# Patient Record
Sex: MALE | Race: BLACK OR AFRICAN AMERICAN
[De-identification: names, ages, dates, MRNs, and addresses within clinical notes are randomized per-mention and may not be internally consistent; named-entity substitution may affect disease eponyms.]

---

## 2017-05-20 ENCOUNTER — HOSPITAL ENCOUNTER (EMERGENCY)
Dept: HOSPITAL 62 - ER | Age: 37
Discharge: HOME | End: 2017-05-20
Payer: SELF-PAY

## 2017-05-20 VITALS — SYSTOLIC BLOOD PRESSURE: 148 MMHG | DIASTOLIC BLOOD PRESSURE: 87 MMHG

## 2017-05-20 DIAGNOSIS — F17.200: ICD-10-CM

## 2017-05-20 DIAGNOSIS — Z86.19: ICD-10-CM

## 2017-05-20 DIAGNOSIS — R10.13: Primary | ICD-10-CM

## 2017-05-20 DIAGNOSIS — Z71.6: ICD-10-CM

## 2017-05-20 LAB
ALBUMIN SERPL-MCNC: 4.2 G/DL (ref 3.5–5)
ALP SERPL-CCNC: 58 U/L (ref 38–126)
ALT SERPL-CCNC: 37 U/L (ref 21–72)
ANION GAP SERPL CALC-SCNC: 10 MMOL/L (ref 5–19)
APPEARANCE UR: (no result)
AST SERPL-CCNC: 25 U/L (ref 17–59)
BASOPHILS # BLD AUTO: 0.1 10^3/UL (ref 0–0.2)
BASOPHILS NFR BLD AUTO: 1 % (ref 0–2)
BILIRUB DIRECT SERPL-MCNC: 0.4 MG/DL (ref 0–0.4)
BILIRUB SERPL-MCNC: 0.5 MG/DL (ref 0.2–1.3)
BILIRUB UR QL STRIP: NEGATIVE
BUN SERPL-MCNC: 12 MG/DL (ref 7–20)
CALCIUM: 9.7 MG/DL (ref 8.4–10.2)
CHLORIDE SERPL-SCNC: 105 MMOL/L (ref 98–107)
CO2 SERPL-SCNC: 27 MMOL/L (ref 22–30)
CREAT SERPL-MCNC: 1.06 MG/DL (ref 0.52–1.25)
EOSINOPHIL # BLD AUTO: 0.4 10^3/UL (ref 0–0.6)
EOSINOPHIL NFR BLD AUTO: 4.3 % (ref 0–6)
ERYTHROCYTE [DISTWIDTH] IN BLOOD BY AUTOMATED COUNT: 13.6 % (ref 11.5–14)
GLUCOSE SERPL-MCNC: 116 MG/DL (ref 75–110)
GLUCOSE UR STRIP-MCNC: NEGATIVE MG/DL
HCT VFR BLD CALC: 42.8 % (ref 37.9–51)
HGB BLD-MCNC: 14.6 G/DL (ref 13.5–17)
HGB HCT DIFFERENCE: 1
KETONES UR STRIP-MCNC: NEGATIVE MG/DL
LIPASE SERPL-CCNC: 141.1 U/L (ref 23–300)
LYMPHOCYTES # BLD AUTO: 2.5 10^3/UL (ref 0.5–4.7)
LYMPHOCYTES NFR BLD AUTO: 30.4 % (ref 13–45)
MCH RBC QN AUTO: 30.3 PG (ref 27–33.4)
MCHC RBC AUTO-ENTMCNC: 34.1 G/DL (ref 32–36)
MCV RBC AUTO: 89 FL (ref 80–97)
MONOCYTES # BLD AUTO: 0.7 10^3/UL (ref 0.1–1.4)
MONOCYTES NFR BLD AUTO: 8.2 % (ref 3–13)
NEUTROPHILS # BLD AUTO: 4.6 10^3/UL (ref 1.7–8.2)
NEUTS SEG NFR BLD AUTO: 56.1 % (ref 42–78)
NITRITE UR QL STRIP: NEGATIVE
PH UR STRIP: 7 [PH] (ref 5–9)
POTASSIUM SERPL-SCNC: 3.8 MMOL/L (ref 3.6–5)
PROT SERPL-MCNC: 7.2 G/DL (ref 6.3–8.2)
PROT UR STRIP-MCNC: NEGATIVE MG/DL
RBC # BLD AUTO: 4.81 10^6/UL (ref 4.35–5.55)
SODIUM SERPL-SCNC: 142.3 MMOL/L (ref 137–145)
SP GR UR STRIP: 1.01
UROBILINOGEN UR-MCNC: NEGATIVE MG/DL (ref ?–2)
WBC # BLD AUTO: 8.3 10^3/UL (ref 4–10.5)

## 2017-05-20 PROCEDURE — 99284 EMERGENCY DEPT VISIT MOD MDM: CPT

## 2017-05-20 PROCEDURE — 81001 URINALYSIS AUTO W/SCOPE: CPT

## 2017-05-20 PROCEDURE — 36415 COLL VENOUS BLD VENIPUNCTURE: CPT

## 2017-05-20 PROCEDURE — 85025 COMPLETE CBC W/AUTO DIFF WBC: CPT

## 2017-05-20 PROCEDURE — 83690 ASSAY OF LIPASE: CPT

## 2017-05-20 PROCEDURE — 80053 COMPREHEN METABOLIC PANEL: CPT

## 2017-05-20 PROCEDURE — 74022 RADEX COMPL AQT ABD SERIES: CPT

## 2017-05-20 NOTE — ER DOCUMENT REPORT
ED GI/





- General


Chief Complaint: Epigastric Pain


Stated Complaint: ABDOMINAL PAIN


Time Seen by Provider: 05/20/17 17:41


Mode of Arrival: Ambulatory


Notes: 


Patient is a 36-year-old male that comes emergency department for chief 

complaint of pain in his upper abdomen that has been present for months, he 

states this is gradually worsening, he has been on Prevacid, omeprazole, he has 

been treated for H. pylori already, he had a recent ultrasound which showed a 

polyp but no cholelithiasis, he states that he is being referred for an 

endoscopy but he has become too uncomfortable over the past several days to 

wait.  He reports nausea, denies vomiting.  He denies fever or chills, he 

denies black or bloody stools.  He states pain is worse when he gets up in the 

morning and also after he eats.  He denies any surgeries, he denies any medical 

history otherwise, he denies any daily medications.





TRAVEL OUTSIDE OF THE [place] IN LAST 30 DAYS: No





- Related Data


Allergies/Adverse Reactions: 


 





No Known Allergies Allergy (Unverified 05/20/17 17:36)


 











Past Medical History





- General


Information source: Patient - pt. states he has been having epigastric pain 

after eating for the past few months -- has seen his PCP and was diagnosed with 

H. Pylori and started on abx but symptoms have persisted





- Social History


Smoking Status: Current Every Day Smoker


Smoking Education Provided: Yes - <3 min


Lives with: Family


Family History: Reviewed & Not Pertinent


Patient has suicidal ideation: No


Patient has homicidal ideation: No





- Medical History


Medical History: Negative


Renal/ Medical History: Denies: Hx Peritoneal Dialysis


Surgical Hx: Negative





- Immunizations


Immunizations up to date: Yes


Hx Diphtheria, Pertussis, Tetanus Vaccination: Yes





Review of Systems





- Review of Systems


Constitutional: No symptoms reported


EENT: No symptoms reported


Cardiovascular: No symptoms reported


Respiratory: No symptoms reported


Gastrointestinal: See HPI


Genitourinary: No symptoms reported


Male Genitourinary: No symptoms reported


Musculoskeletal: No symptoms reported


Skin: No symptoms reported


Hematologic/Lymphatic: No symptoms reported


Neurological/Psychological: No symptoms reported





Physical Exam





- Vital signs


Vitals: 


 











Temp Pulse Resp BP Pulse Ox


 


 98.1 F   91   18   147/88 H  98 


 


 05/20/17 17:37  05/20/17 17:37  05/20/17 17:37  05/20/17 17:37  05/20/17 17:37











Interpretation: Normal





- General


General appearance: Appears well, Alert


In distress: None





- HEENT


Head: Normocephalic, Atraumatic


Eyes: Normal


Conjunctiva: Normal


Extraocular movements intact: Yes


Eyelashes: Normal


Pupils: PERRL


Mouth/Lips: Normal


Mucous membranes: Normal


Pharynx: Normal


Neck: Normal





- Respiratory


Respiratory status: No respiratory distress


Chest status: Nontender


Breath sounds: Normal


Chest palpation: Normal





- Cardiovascular


Rhythm: Regular


Heart sounds: Normal auscultation


Murmur: No





- Abdominal


Inspection: Normal


Distension: No distension


Bowel sounds: Normal


Tenderness: Nontender - Unremarkable abdominal exam, no tenderness or guarding 

noted.  No: Tender


Organomegaly: No organomegaly





- Back


Back: Normal, Nontender.  No: Tender





- Extremities


General upper extremity: Normal inspection, Nontender, Normal color, Normal ROM

, Normal temperature


General lower extremity: Normal inspection, Nontender, Normal color, Normal ROM

, Normal temperature, Normal weight bearing.  No: Ruby's sign





- Neurological


Neuro grossly intact: Yes


Cognition: Normal


Orientation: AAOx4


Stanley Coma Scale Eye Opening: Spontaneous


Roldan Coma Scale Verbal: Oriented


Roldan Coma Scale Motor: Obeys Commands


Roldan Coma Scale Total: 15


Speech: Normal


Cranial nerves: Normal


Cerebellar coordination: Normal


Motor strength normal: LUE, RUE, LLE, RLE


Additional motor exam normals: Equal 


Sensory: Normal





- Psychological


Associated symptoms: Normal affect, Normal mood





- Skin


Skin Temperature: Warm


Skin Moisture: Dry


Skin Color: Normal





Course





- Re-evaluation


Re-evalutation: 


Reviewed acute abdominal series performed in triage, no acute abnormality 

noted.  CBC, chemistry, lipase, urinalysis unremarkable.  Patient already has 

had an ultrasound, has an unremarkable abdominal exam, reports pain 

specifically in the morning and after eating, already has an endoscopy in a few 

days scheduled to be performed.  Patient will be given Carafate, omeprazole, 

some pain medicine to take as needed, discussed smoking cessation, discussed 

follow-up and return precautions.  Patient states understanding and agreement, 

states he will quit smoking, states he will take the medications and return for 

any concerning symptoms as discussed.





- Vital Signs


Vital signs: 


 











Temp Pulse Resp BP Pulse Ox


 


 97.7 F   66   17   148/87 H  97 


 


 05/20/17 20:18  05/20/17 20:18  05/20/17 20:18  05/20/17 20:18  05/20/17 20:18














- Laboratory


Result Diagrams: 


 05/20/17 18:00





 05/20/17 18:00


Laboratory results interpreted by me: 


 











  05/20/17





  18:00


 


Glucose  116 H














Discharge





- Discharge


Clinical Impression: 


 Epigastric pain





Condition: Stable


Disposition: HOME, SELF-CARE


Additional Instructions: 


Your laboratory workup and examination today does not show any acute findings.


Please follow-up with your endoscopy as planned, take the Carafate and 

omeprazole prescriptions as prescribed, take the pain medicine only if needed 

additionally.  Avoid spicy food, smoking, alcohol, over-the-counter anti-

inflammatories.


Return for any concerning or worsening symptoms including vomiting, vomiting 

blood, black stools, or severe continuous abdominal pain.


Prescriptions: 


Hydrocodone/Acetaminophen [Norco 5-325 mg Tablet] 1 - 2 tab PO ASDIR #10 tablet


Omeprazole 40 mg PO DAILY #30 capsule.


Sucralfate [Carafate 1 gm Tablet] 1 gm PO QID #40 tablet


Forms:  Elevated Blood Pressure


Referrals: 


BEBETO VALENZUELA MD [Primary Care Provider] - Follow up as needed

## 2017-05-20 NOTE — ER DOCUMENT REPORT
ED Medical Screen (RME)





- General


Chief Complaint: Epigastric Pain


Stated Complaint: ABDOMINAL PAIN


Time Seen by Provider: 05/20/17 17:41


Mode of Arrival: Ambulatory


Information source: Patient - pt. states he has been having epigastric pain 

after eating for the past few months -- has seen his PCP and was diagnosed with 

H. Pylori and started on abx but symptoms have persisted


TRAVEL OUTSIDE OF THE [place] IN LAST 30 DAYS: No





- Related Data


Allergies/Adverse Reactions: 


 





No Known Allergies Allergy (Unverified 05/20/17 17:36)


 











Past Medical History


Renal/ Medical History: Denies: Hx Peritoneal Dialysis





Physical Exam





- Vital signs


Vitals: 





 











Temp Pulse Resp BP Pulse Ox


 


 98.1 F   91   18   147/88 H  98 


 


 05/20/17 17:37  05/20/17 17:37  05/20/17 17:37  05/20/17 17:37  05/20/17 17:37














Course





- Vital Signs


Vital signs: 





 











Temp Pulse Resp BP Pulse Ox


 


 98.1 F   91   18   147/88 H  98 


 


 05/20/17 17:37  05/20/17 17:37  05/20/17 17:37  05/20/17 17:37  05/20/17 17:37

## 2017-05-25 ENCOUNTER — HOSPITAL ENCOUNTER (EMERGENCY)
Dept: HOSPITAL 62 - ER | Age: 37
Discharge: ANOTHER HOSPITAL | End: 2017-05-25
Payer: SELF-PAY

## 2017-05-25 VITALS — DIASTOLIC BLOOD PRESSURE: 83 MMHG | SYSTOLIC BLOOD PRESSURE: 147 MMHG

## 2017-05-25 DIAGNOSIS — R10.13: ICD-10-CM

## 2017-05-25 DIAGNOSIS — R11.2: ICD-10-CM

## 2017-05-25 DIAGNOSIS — Z86.19: ICD-10-CM

## 2017-05-25 DIAGNOSIS — R10.12: ICD-10-CM

## 2017-05-25 DIAGNOSIS — I16.1: Primary | ICD-10-CM

## 2017-05-25 DIAGNOSIS — I10: ICD-10-CM

## 2017-05-25 LAB
ALBUMIN SERPL-MCNC: 5.1 G/DL (ref 3.5–5)
ALP SERPL-CCNC: 78 U/L (ref 38–126)
ALT SERPL-CCNC: 43 U/L (ref 21–72)
ANION GAP SERPL CALC-SCNC: 16 MMOL/L (ref 5–19)
APPEARANCE UR: CLEAR
APTT BLD: 32.9 SEC (ref 23.5–35.8)
AST SERPL-CCNC: 27 U/L (ref 17–59)
BASOPHILS # BLD AUTO: 0.1 10^3/UL (ref 0–0.2)
BASOPHILS NFR BLD AUTO: 0.8 % (ref 0–2)
BILIRUB DIRECT SERPL-MCNC: 0.3 MG/DL (ref 0–0.4)
BILIRUB SERPL-MCNC: 0.7 MG/DL (ref 0.2–1.3)
BILIRUB UR QL STRIP: NEGATIVE
BUN SERPL-MCNC: 11 MG/DL (ref 7–20)
CALCIUM: 10.5 MG/DL (ref 8.4–10.2)
CHLORIDE SERPL-SCNC: 102 MMOL/L (ref 98–107)
CK MB SERPL-MCNC: 1.85 NG/ML (ref ?–4.55)
CO2 SERPL-SCNC: 25 MMOL/L (ref 22–30)
CREAT SERPL-MCNC: 1.06 MG/DL (ref 0.52–1.25)
EOSINOPHIL # BLD AUTO: 0.1 10^3/UL (ref 0–0.6)
EOSINOPHIL NFR BLD AUTO: 0.4 % (ref 0–6)
ERYTHROCYTE [DISTWIDTH] IN BLOOD BY AUTOMATED COUNT: 13.7 % (ref 11.5–14)
GLUCOSE SERPL-MCNC: 126 MG/DL (ref 75–110)
GLUCOSE UR STRIP-MCNC: NEGATIVE MG/DL
HCT VFR BLD CALC: 51 % (ref 37.9–51)
HGB BLD-MCNC: 17.2 G/DL (ref 13.5–17)
HGB HCT DIFFERENCE: 0.6
KETONES UR STRIP-MCNC: 20 MG/DL
LYMPHOCYTES # BLD AUTO: 1.3 10^3/UL (ref 0.5–4.7)
LYMPHOCYTES NFR BLD AUTO: 10 % (ref 13–45)
MCH RBC QN AUTO: 29.9 PG (ref 27–33.4)
MCHC RBC AUTO-ENTMCNC: 33.8 G/DL (ref 32–36)
MCV RBC AUTO: 88 FL (ref 80–97)
MONOCYTES # BLD AUTO: 0.6 10^3/UL (ref 0.1–1.4)
MONOCYTES NFR BLD AUTO: 4.4 % (ref 3–13)
NEUTROPHILS # BLD AUTO: 11.3 10^3/UL (ref 1.7–8.2)
NEUTS SEG NFR BLD AUTO: 84.4 % (ref 42–78)
NITRITE UR QL STRIP: NEGATIVE
PH UR STRIP: 9 [PH] (ref 5–9)
POTASSIUM SERPL-SCNC: 4.1 MMOL/L (ref 3.6–5)
PROT SERPL-MCNC: 8.3 G/DL (ref 6.3–8.2)
PROT UR STRIP-MCNC: NEGATIVE MG/DL
PROTHROMBIN TIME: 12.9 SEC (ref 11.4–15.4)
RBC # BLD AUTO: 5.77 10^6/UL (ref 4.35–5.55)
SODIUM SERPL-SCNC: 142.5 MMOL/L (ref 137–145)
SP GR UR STRIP: 1.01
TROPONIN I SERPL-MCNC: 0.12 NG/ML
UROBILINOGEN UR-MCNC: NEGATIVE MG/DL (ref ?–2)
WBC # BLD AUTO: 13.3 10^3/UL (ref 4–10.5)

## 2017-05-25 PROCEDURE — 96366 THER/PROPH/DIAG IV INF ADDON: CPT

## 2017-05-25 PROCEDURE — 84484 ASSAY OF TROPONIN QUANT: CPT

## 2017-05-25 PROCEDURE — 74177 CT ABD & PELVIS W/CONTRAST: CPT

## 2017-05-25 PROCEDURE — 85730 THROMBOPLASTIN TIME PARTIAL: CPT

## 2017-05-25 PROCEDURE — 96365 THER/PROPH/DIAG IV INF INIT: CPT

## 2017-05-25 PROCEDURE — 82550 ASSAY OF CK (CPK): CPT

## 2017-05-25 PROCEDURE — 85025 COMPLETE CBC W/AUTO DIFF WBC: CPT

## 2017-05-25 PROCEDURE — 93010 ELECTROCARDIOGRAM REPORT: CPT

## 2017-05-25 PROCEDURE — 96376 TX/PRO/DX INJ SAME DRUG ADON: CPT

## 2017-05-25 PROCEDURE — 99291 CRITICAL CARE FIRST HOUR: CPT

## 2017-05-25 PROCEDURE — 93005 ELECTROCARDIOGRAM TRACING: CPT

## 2017-05-25 PROCEDURE — 96375 TX/PRO/DX INJ NEW DRUG ADDON: CPT

## 2017-05-25 PROCEDURE — 80053 COMPREHEN METABOLIC PANEL: CPT

## 2017-05-25 PROCEDURE — 36415 COLL VENOUS BLD VENIPUNCTURE: CPT

## 2017-05-25 PROCEDURE — 85610 PROTHROMBIN TIME: CPT

## 2017-05-25 PROCEDURE — 81001 URINALYSIS AUTO W/SCOPE: CPT

## 2017-05-25 PROCEDURE — 82553 CREATINE MB FRACTION: CPT

## 2017-05-25 RX ADMIN — NITROGLYCERIN PRN TAB: 0.4 TABLET SUBLINGUAL at 09:28

## 2017-05-25 RX ADMIN — NITROGLYCERIN PRN TAB: 0.4 TABLET SUBLINGUAL at 09:35

## 2017-05-25 NOTE — RADIOLOGY REPORT (SQ)
EXAM DESCRIPTION:  CT ABD/PELVIS WITH IV ONLY



COMPLETED DATE/TIME:  5/25/2017 9:15 am



REASON FOR STUDY:  LUQ pain



COMPARISON:  None.



TECHNIQUE:  CT scan of the abdomen and pelvis performed using helical scanning technique with dynamic
 intravenous contrast injection.  No oral contrast. Images reviewed with lung, soft tissue, and bone 
windows. Reconstructed coronal and sagittal MPR images reviewed. Delayed images for evaluation of the
 urinary system also acquired. All images stored on PACS.

All CT scanners at this facility use dose modulation, iterative reconstruction, and/or weight based d
osing when appropriate to reduce radiation dose to as low as reasonably achievable (ALARA).

[facility]: Dose Right  [facility]: CareDose    [facility]: Dose Right    [facility]: Teradose 4D    [facility]: Smart Technologies



CONTRAST TYPE AND DOSE:  90mL Isovue 370- low osmolar.



RENAL FUNCTION:  1.06



RADIATION DOSE:  13.54mGy.



LIMITATIONS:  None.



FINDINGS:  LOWER CHEST: No significant findings. No nodules or infiltrates.

LIVER: Normal size. No masses or dilated ducts.

SPLEEN: Normal size. No focal lesions.

PANCREAS: No masses. No significant calcifications. No adjacent inflammation or peripancreatic fluid 
collections. Pancreatic duct not dilated.

GALLBLADDER: No identified stones by CT criteria. No inflammatory changes to suggest cholecystitis.

ADRENAL GLANDS: No significant masses or asymmetry.

RIGHT KIDNEY AND URETER: No solid masses.   No significant calcifications.   No hydronephrosis or hyd
roureter.

LEFT KIDNEY AND URETER: A couple small less than 1 cm cysts.  No solid masses.   No significant calci
fications.   No hydronephrosis or hydroureter.

AORTA AND VESSELS: No aneurysm. No dissection. Renal arteries, SMA, celiac without stenosis.

RETROPERITONEUM: No retroperitoneal adenopathy, hemorrhage or masses.

BOWEL AND PERITONEAL CAVITY: No masses or inflammatory changes. No free fluid or peritoneal masses.

APPENDIX: Normal.

PELVIS: No mass or free fluid. Normal bladder.

ABDOMINAL WALL: Small fat containing umbilical hernia not felt to be significant.

BONES: No significant or acute findings.

OTHER: No other significant finding.



IMPRESSION:  NO SIGNIFICANT OR ACUTE FINDING IN THE ABDOMEN OR PELVIS ON CT SCAN WITH IV CONTRAST.



TECHNICAL DOCUMENTATION:  JOB ID:  [id number]

Quality ID # 436: Final reports with documentation of one or more dose reduction techniques (e.g., Au
tomated exposure control, adjustment of the mA and/or kV according to patient size, use of iterative 
reconstruction technique)

 2011 [employer]- All Rights Reserved

## 2017-05-25 NOTE — EKG REPORT
SEVERITY:- ABNORMAL ECG -

SINUS RHYTHM

PROBABLE LEFT ATRIAL ABNORMALITY

ABNORMAL T, PROBABLE ISCHEMIA, ANT-LAT LEADS

:

Confirmed by: Renard Palacios MD 25-May-2017 18:44:27

## 2017-05-25 NOTE — ER DOCUMENT REPORT
ED General





- General


Chief Complaint: Epigastric Pain


Stated Complaint: CHEST PAIN,ABDOMINAL PAIN


Time Seen by Provider: 05/25/17 07:59


Mode of Arrival: Ambulatory


Information source: Patient


Notes: 


36-year-old male presents with complaints of left upper quadrant abdominal pain 

epigastric pain of 8 months duration.  Patient denies any fevers or chills, 

admits nausea vomiting.  Patient has been seen multiple times for this and has 

been treated for H. pylori and was treated for ulcers which improved his 

symptoms but did not resolve them.  Patient notes pain worsened this morning.  

Patient has also been told he has a polyp on his gallbladder from previous 

ultrasound but no gallstones


TRAVEL OUTSIDE OF THE [place] IN LAST 30 DAYS: No





- HPI


Onset: Other


Onset/Duration: Intermittent


Quality of pain: Burning


Severity: Mild


Pain Level: 1


Associated symptoms: Nausea, Vomiting


Exacerbated by: Denies


Relieved by: Denies


Similar symptoms previously: Yes


Recently seen / treated by doctor: Yes





- Related Data


Allergies/Adverse Reactions: 


 





No Known Allergies Allergy (Unverified 05/20/17 17:36)


 











Past Medical History





- Social History


Smoking Status: Never Smoker


Cigarette use (# per day): No


Chew tobacco use (# tins/day): No


Smoking Education Provided: No


Family History: Reviewed & Not Pertinent


Renal/ Medical History: Denies: Hx Peritoneal Dialysis





- Immunizations


Immunizations up to date: Yes


Hx Diphtheria, Pertussis, Tetanus Vaccination: Yes





Review of Systems





- Review of Systems


Notes: 


PHYSICAL EXAMINATION:





GENERAL: Well-appearing, well-nourished and in no acute distress.





HEAD: Atraumatic, normocephalic.





EYES: Pupils equal round and reactive to light, extraocular movements intact, 

conjunctiva are normal.





ENT: Nares patent, oropharynx clear without exudates.  Moist mucous membranes.





NECK: Normal range of motion, supple without lymphadenopathy





LUNGS: Breath sounds clear to auscultation bilaterally and equal.  No wheezes 

rales or rhonchi.





HEART: Regular rate and rhythm without murmurs





ABDOMEN: Soft, tender in epigastric left upper quadrant no rebound no guarding





Female : deferred





Musculoskeletal: Normal range of motion, no pitting or edema.  No cyanosis.





NEUROLOGICAL: Cranial nerves grossly intact.  Normal speech, normal gait.  

Normal sensory, motor exams





PSYCH: Normal mood, normal affect.





SKIN: Warm, Dry, normal turgor, no rashes or lesions noted.





Physical Exam





- Vital signs


Vitals: 


 











Temp Pulse Resp BP Pulse Ox


 


 98.2 F   77   22 H  229/117 H  100 


 


 05/25/17 07:52  05/25/17 07:52  05/25/17 07:52  05/25/17 07:52  05/25/17 07:52














Course





- Re-evaluation


Re-evalutation: 


05/25/17 08:10


Patient has probable ulceration, this has possiviltiy to be cardiac in nature, 

a CT has been ordered I will evaluate for left upper quadrant and epigastric 

pain.





05/25/17 09:36


Patient is noted to have significant EKG changes, he is quite hypertensive, his 

troponin is noted to be 0.119


[facility] has been paged





05/25/17 10:24


Spoke with Dr oseguera WHO WILL ACCEPT FOR TRANSFER





05/25/17 13:54


   Patient is stable blood pressures improved he states he feels much better, 

transferred here to take him,





- Vital Signs


Vital signs: 


 











Temp Pulse Resp BP Pulse Ox


 


 98.2 F   77   27 H  147/83 H  95 


 


 05/25/17 13:46  05/25/17 07:52  05/25/17 13:32  05/25/17 13:32  05/25/17 13:32














- Laboratory


Result Diagrams: 


 05/25/17 08:15





 05/25/17 08:15


Laboratory results interpreted by me: 


 











  05/25/17 05/25/17 05/25/17





  08:15 08:15 08:15


 


WBC  13.3 H  


 


RBC  5.77 H  


 


Hgb  17.2 H  


 


Seg Neutrophils %  84.4 H  


 


Lymphocytes %  10.0 L  


 


Absolute Neutrophils  11.3 H  


 


Glucose   126 H 


 


Calcium   10.5 H 


 


Creatine Kinase    203 H


 


Total Protein   8.3 H 


 


Albumin   5.1 H 


 


Urine Ketones   














  05/25/17





  09:39


 


WBC 


 


RBC 


 


Hgb 


 


Seg Neutrophils % 


 


Lymphocytes % 


 


Absolute Neutrophils 


 


Glucose 


 


Calcium 


 


Creatine Kinase 


 


Total Protein 


 


Albumin 


 


Urine Ketones  20 H














Critical Care Note





- Critical Care Note


Total time excluding time spent on procedures (mins): 44


Comments: 


44 minutes of critical care time spent in direct contact evaluating and 

reevaluating the patient, treating symptoms, reviewing labs and studies and 

speaking with family  and consultants excluding any procedures





Discharge





- Discharge


Clinical Impression: 


 Hypertensive emergency





Abdominal pain


Qualifiers:


 Abdominal location: epigastric Qualified Code(s): R10.13 - Epigastric pain





Condition: Serious


Disposition: [facility]

## 2017-06-09 ENCOUNTER — HOSPITAL ENCOUNTER (OUTPATIENT)
Dept: HOSPITAL 62 - END | Age: 37
Discharge: HOME | End: 2017-06-09
Attending: INTERNAL MEDICINE
Payer: SELF-PAY

## 2017-06-09 VITALS — DIASTOLIC BLOOD PRESSURE: 77 MMHG | SYSTOLIC BLOOD PRESSURE: 140 MMHG

## 2017-06-09 DIAGNOSIS — Z79.899: ICD-10-CM

## 2017-06-09 DIAGNOSIS — I10: ICD-10-CM

## 2017-06-09 DIAGNOSIS — B96.81: ICD-10-CM

## 2017-06-09 DIAGNOSIS — K29.50: Primary | ICD-10-CM

## 2017-06-09 PROCEDURE — 88305 TISSUE EXAM BY PATHOLOGIST: CPT

## 2017-06-09 PROCEDURE — 43239 EGD BIOPSY SINGLE/MULTIPLE: CPT

## 2017-06-09 PROCEDURE — 88342 IMHCHEM/IMCYTCHM 1ST ANTB: CPT

## 2017-06-09 PROCEDURE — 0DB68ZX EXCISION OF STOMACH, VIA NATURAL OR ARTIFICIAL OPENING ENDOSCOPIC, DIAGNOSTIC: ICD-10-PCS | Performed by: INTERNAL MEDICINE

## 2017-06-09 RX ADMIN — FENTANYL CITRATE ONE MCG: 50 INJECTION INTRAMUSCULAR; INTRAVENOUS at 08:12

## 2017-06-09 RX ADMIN — MIDAZOLAM HYDROCHLORIDE ONE MG: 1 INJECTION, SOLUTION INTRAMUSCULAR; INTRAVENOUS at 08:14

## 2017-06-09 RX ADMIN — MIDAZOLAM HYDROCHLORIDE ONE MG: 1 INJECTION, SOLUTION INTRAMUSCULAR; INTRAVENOUS at 08:04

## 2017-06-09 RX ADMIN — FENTANYL CITRATE ONE MCG: 50 INJECTION INTRAMUSCULAR; INTRAVENOUS at 08:06

## 2017-06-09 RX ADMIN — MIDAZOLAM HYDROCHLORIDE ONE MG: 1 INJECTION, SOLUTION INTRAMUSCULAR; INTRAVENOUS at 08:10

## 2017-06-09 NOTE — OPERATIVE REPORT
Operative Report


DATE OF SURGERY: 06/09/17


Operative Report: 


The risks benefits and alternatives of the procedure explained to the patient 

in detail and informed consent is obtained. A GIF Olympus video scope was 

inserted into the patient's mouth and hypopharynx, the esophagus is identified 

intubated and insufflated, the scope was then advanced through the esophagus 

stomach and duodenum, retroflexion maneuver is done, the esophagus stomach and 

first and second portions of the duodenum examined


PREOPERATIVE DIAGNOSIS: Epigastric pain


POSTOPERATIVE DIAGNOSIS: Gastritis status post biopsy for Helicobacter pylori


OPERATION: EGD with biopsy


SURGEON: SCOTT PROCTOR


ANESTHESIA: Moderate Sedation - 6 mg of Versed, 100 mcg of fentanyl.  Conscious 

sedation monitoring time 15 minutes.


TISSUE REMOVED OR ALTERED: Gastric specimen obtained to rule out Helicobacter 

pylori


COMPLICATIONS: 


None.


ESTIMATED BLOOD LOSS: None.


INTRAOPERATIVE FINDINGS: Normal esophagus.  First and second portions of the 

duodenum are normal


PROCEDURE: 


Patient tolerated procedure well.


No immediate postprocedure complications are noted.


Patient discharged in good condition.


Discharge date 6/9/2017.


Discharge diet: Regular.


Discharge activity: Regular.


2-3 week follow-up to discuss findings.


We will wait on biopsies.


Patient is instructed to call the office or proceed to the emergency room 

should there be any further contact questions.

## 2017-11-15 ENCOUNTER — HOSPITAL ENCOUNTER (OUTPATIENT)
Dept: HOSPITAL 62 - CCC | Age: 37
End: 2017-11-15
Payer: COMMERCIAL

## 2017-11-15 DIAGNOSIS — I10: Primary | ICD-10-CM

## 2017-11-15 DIAGNOSIS — R10.9: ICD-10-CM

## 2017-11-15 DIAGNOSIS — R63.4: ICD-10-CM

## 2017-11-15 PROCEDURE — 82150 ASSAY OF AMYLASE: CPT

## 2017-11-15 PROCEDURE — 80053 COMPREHEN METABOLIC PANEL: CPT

## 2017-11-15 PROCEDURE — 85025 COMPLETE CBC W/AUTO DIFF WBC: CPT

## 2017-11-15 PROCEDURE — 83690 ASSAY OF LIPASE: CPT

## 2017-11-15 PROCEDURE — 80061 LIPID PANEL: CPT

## 2017-11-15 PROCEDURE — 84153 ASSAY OF PSA TOTAL: CPT

## 2017-11-15 PROCEDURE — 84443 ASSAY THYROID STIM HORMONE: CPT

## 2017-11-15 PROCEDURE — 83036 HEMOGLOBIN GLYCOSYLATED A1C: CPT

## 2017-11-15 PROCEDURE — 36415 COLL VENOUS BLD VENIPUNCTURE: CPT

## 2017-11-16 LAB
ALBUMIN SERPL-MCNC: 4.5 G/DL (ref 3.5–5)
ALP SERPL-CCNC: 50 U/L (ref 38–126)
ALT SERPL-CCNC: 37 U/L (ref 21–72)
AMYLASE SERPL-CCNC: 106 U/L (ref 30–110)
ANION GAP SERPL CALC-SCNC: 8 MMOL/L (ref 5–19)
AST SERPL-CCNC: 23 U/L (ref 17–59)
BASOPHILS # BLD AUTO: 0.1 10^3/UL (ref 0–0.2)
BASOPHILS NFR BLD AUTO: 0.9 % (ref 0–2)
BILIRUB DIRECT SERPL-MCNC: 0.5 MG/DL (ref 0–0.4)
BILIRUB SERPL-MCNC: 0.7 MG/DL (ref 0.2–1.3)
BUN SERPL-MCNC: 11 MG/DL (ref 7–20)
CALCIUM: 9.7 MG/DL (ref 8.4–10.2)
CHLORIDE SERPL-SCNC: 104 MMOL/L (ref 98–107)
CHOLEST SERPL-MCNC: 166.45 MG/DL (ref 0–200)
CO2 SERPL-SCNC: 30 MMOL/L (ref 22–30)
CREAT SERPL-MCNC: 1.15 MG/DL (ref 0.52–1.25)
DIRECT HDL: 30 MG/DL (ref 40–?)
EOSINOPHIL # BLD AUTO: 0.3 10^3/UL (ref 0–0.6)
EOSINOPHIL NFR BLD AUTO: 3.5 % (ref 0–6)
ERYTHROCYTE [DISTWIDTH] IN BLOOD BY AUTOMATED COUNT: 13.8 % (ref 11.5–14)
GLUCOSE SERPL-MCNC: 91 MG/DL (ref 75–110)
HCT VFR BLD CALC: 42.1 % (ref 37.9–51)
HGB BLD-MCNC: 14.7 G/DL (ref 13.5–17)
HGB HCT DIFFERENCE: 2
LDLC SERPL DIRECT ASSAY-MCNC: 126 MG/DL (ref ?–100)
LIPASE SERPL-CCNC: 333.5 U/L (ref 23–300)
LYMPHOCYTES # BLD AUTO: 2.2 10^3/UL (ref 0.5–4.7)
LYMPHOCYTES NFR BLD AUTO: 27.8 % (ref 13–45)
MCH RBC QN AUTO: 30.9 PG (ref 27–33.4)
MCHC RBC AUTO-ENTMCNC: 35.1 G/DL (ref 32–36)
MCV RBC AUTO: 88 FL (ref 80–97)
MONOCYTES # BLD AUTO: 0.6 10^3/UL (ref 0.1–1.4)
MONOCYTES NFR BLD AUTO: 8 % (ref 3–13)
NEUTROPHILS # BLD AUTO: 4.7 10^3/UL (ref 1.7–8.2)
NEUTS SEG NFR BLD AUTO: 59.8 % (ref 42–78)
POTASSIUM SERPL-SCNC: 4.2 MMOL/L (ref 3.6–5)
PROT SERPL-MCNC: 7 G/DL (ref 6.3–8.2)
PSA SERPL-MCNC: 0.39 NG/ML (ref ?–4)
RBC # BLD AUTO: 4.77 10^6/UL (ref 4.35–5.55)
SODIUM SERPL-SCNC: 142.2 MMOL/L (ref 137–145)
TRIGL SERPL-MCNC: 77 MG/DL (ref ?–150)
VLDLC SERPL CALC-MCNC: 15 MG/DL (ref 10–31)
WBC # BLD AUTO: 7.8 10^3/UL (ref 4–10.5)

## 2017-12-13 ENCOUNTER — HOSPITAL ENCOUNTER (OUTPATIENT)
Dept: HOSPITAL 62 - RAD | Age: 37
End: 2017-12-13
Payer: COMMERCIAL

## 2017-12-13 DIAGNOSIS — R63.4: ICD-10-CM

## 2017-12-13 DIAGNOSIS — Z80.0: ICD-10-CM

## 2017-12-13 DIAGNOSIS — R10.9: Primary | ICD-10-CM

## 2017-12-13 DIAGNOSIS — G89.29: ICD-10-CM

## 2017-12-13 PROCEDURE — 74177 CT ABD & PELVIS W/CONTRAST: CPT

## 2017-12-13 NOTE — RADIOLOGY REPORT (SQ)
EXAM DESCRIPTION:  CT ABD/PELVIS WITH IV   ORAL



COMPLETED DATE/TIME:  12/13/2017 9:10 am



REASON FOR STUDY:  UNSPECIFIED ABDOMINAL PAIN, ABN WEIGHT LOSS R10.9  UNSPECIFIED ABDOMINAL PAIN R63.
4  ABNORMAL WEIGHT LOSS



COMPARISON:  None.



TECHNIQUE:  CT scan of the abdomen and pelvis performed with intravenous and oral contrast using jose daniel
bill scanning technique with dynamic intravenous contrast injection. Images reviewed with lung, soft t
issue, and bone windows. Reconstructed coronal and sagittal MPR images reviewed. Delayed images for e
valuation of the urinary system also acquired. All images stored on PACS.

All CT scanners at this facility use dose modulation, iterative reconstruction, and/or weight based d
osing when appropriate to reduce radiation dose to as low as reasonably achievable (ALARA).

[facility]: Dose Right  [facility]: CareDose    [facility]: Dose Right    [facility]: Teradose 4D    [facility]: Smart Technologies



CONTRAST TYPE AND DOSE:  contrast/concentration: Isovue 370.00 mg/ml; Total Contrast Delivered: 83.0 
ml; Total Saline Delivered: 38.9 ml



RENAL FUNCTION:  Creatinine 1.2



RADIATION DOSE:  CT Rad equipment meets quality standard of care and radiation dose reduction techniq
ues were employed. CTDIvol: 5.2 - 6.9 mGy. DLP: 636 mGy-cm. .



LIMITATIONS:  None.



FINDINGS:  LOWER CHEST: No significant findings. No nodules or infiltrates.

LIVER: Normal size. No masses.  No dilated ducts.

SPLEEN: Normal size. No focal lesions.

PANCREAS: No masses. No significant calcifications. No adjacent inflammation or peripancreatic fluid 
collections. Pancreatic duct not dilated.

GALLBLADDER: No identified stones by CT criteria. No inflammatory changes to suggest cholecystitis.

ADRENAL GLANDS: No significant masses or asymmetry.

RIGHT KIDNEY AND URETER: No solid masses.   No significant calcifications.   No hydronephrosis or hyd
roureter.

LEFT KIDNEY AND URETER: No solid masses.   No significant calcifications.   No hydronephrosis or hydr
oureter.

AORTA AND VESSELS: No aneurysm. No dissection. Renal arteries, SMA, celiac without stenosis.

RETROPERITONEUM: No retroperitoneal adenopathy, hemorrhage or masses.

BOWEL AND PERITONEAL CAVITY: There is apparent thickening of the wall of the hepatic flexure.  This m
ay be artifact of nondistention.  No ascites, adenopathy or free air.

APPENDIX: Normal.

PELVIS: No significant masses.  Normal bladder. No free fluid.

ABDOMINAL WALL: No masses. No hernias.

BONES: No significant or acute findings.

OTHER: No other significant finding.



IMPRESSION:  Wall thickening hepatic flexure.  Cannot exclude underlying mass.  No adenopathy or asci
vicente.



TECHNICAL DOCUMENTATION:  JOB ID:  [id number]

Quality ID # 436: Final reports with documentation of one or more dose reduction techniques (e.g., Au
tomated exposure control, adjustment of the mA and/or kV according to patient size, use of iterative 
reconstruction technique)

 2011 [employer]- All Rights Reserved

## 2018-02-09 ENCOUNTER — HOSPITAL ENCOUNTER (EMERGENCY)
Dept: HOSPITAL 62 - ER | Age: 38
Discharge: HOME | End: 2018-02-09
Payer: SELF-PAY

## 2018-02-09 VITALS — SYSTOLIC BLOOD PRESSURE: 147 MMHG | DIASTOLIC BLOOD PRESSURE: 86 MMHG

## 2018-02-09 DIAGNOSIS — R74.8: ICD-10-CM

## 2018-02-09 DIAGNOSIS — I10: ICD-10-CM

## 2018-02-09 DIAGNOSIS — R07.9: Primary | ICD-10-CM

## 2018-02-09 LAB
ADD MANUAL DIFF: NO
ALBUMIN SERPL-MCNC: 4.4 G/DL (ref 3.5–5)
ALP SERPL-CCNC: 53 U/L (ref 38–126)
ALT SERPL-CCNC: 25 U/L (ref 21–72)
ANION GAP SERPL CALC-SCNC: 9 MMOL/L (ref 5–19)
AST SERPL-CCNC: 20 U/L (ref 17–59)
BASOPHILS # BLD AUTO: 0.1 10^3/UL (ref 0–0.2)
BASOPHILS NFR BLD AUTO: 0.8 % (ref 0–2)
BILIRUB DIRECT SERPL-MCNC: 0.1 MG/DL (ref 0–0.4)
BILIRUB SERPL-MCNC: 0.7 MG/DL (ref 0.2–1.3)
BUN SERPL-MCNC: 13 MG/DL (ref 7–20)
CALCIUM: 9.5 MG/DL (ref 8.4–10.2)
CHLORIDE SERPL-SCNC: 106 MMOL/L (ref 98–107)
CK MB SERPL-MCNC: 1.55 NG/ML (ref ?–4.55)
CK SERPL-CCNC: 386 U/L (ref 55–170)
CO2 SERPL-SCNC: 28 MMOL/L (ref 22–30)
EOSINOPHIL # BLD AUTO: 0.3 10^3/UL (ref 0–0.6)
EOSINOPHIL NFR BLD AUTO: 3.8 % (ref 0–6)
ERYTHROCYTE [DISTWIDTH] IN BLOOD BY AUTOMATED COUNT: 13.9 % (ref 11.5–14)
GLUCOSE SERPL-MCNC: 111 MG/DL (ref 75–110)
HCT VFR BLD CALC: 40.6 % (ref 37.9–51)
HGB BLD-MCNC: 14.1 G/DL (ref 13.5–17)
LYMPHOCYTES # BLD AUTO: 2.1 10^3/UL (ref 0.5–4.7)
LYMPHOCYTES NFR BLD AUTO: 27.9 % (ref 13–45)
MCH RBC QN AUTO: 30.2 PG (ref 27–33.4)
MCHC RBC AUTO-ENTMCNC: 34.9 G/DL (ref 32–36)
MCV RBC AUTO: 87 FL (ref 80–97)
MONOCYTES # BLD AUTO: 0.6 10^3/UL (ref 0.1–1.4)
MONOCYTES NFR BLD AUTO: 8.7 % (ref 3–13)
NEUTROPHILS # BLD AUTO: 4.4 10^3/UL (ref 1.7–8.2)
NEUTS SEG NFR BLD AUTO: 58.8 % (ref 42–78)
PLATELET # BLD: 234 10^3/UL (ref 150–450)
POTASSIUM SERPL-SCNC: 3.5 MMOL/L (ref 3.6–5)
PROT SERPL-MCNC: 6.5 G/DL (ref 6.3–8.2)
RBC # BLD AUTO: 4.69 10^6/UL (ref 4.35–5.55)
SODIUM SERPL-SCNC: 143 MMOL/L (ref 137–145)
TOTAL CELLS COUNTED % (AUTO): 100 %
TROPONIN I SERPL-MCNC: 0.02 NG/ML
WBC # BLD AUTO: 7.5 10^3/UL (ref 4–10.5)

## 2018-02-09 PROCEDURE — 84484 ASSAY OF TROPONIN QUANT: CPT

## 2018-02-09 PROCEDURE — 93010 ELECTROCARDIOGRAM REPORT: CPT

## 2018-02-09 PROCEDURE — 82550 ASSAY OF CK (CPK): CPT

## 2018-02-09 PROCEDURE — 71045 X-RAY EXAM CHEST 1 VIEW: CPT

## 2018-02-09 PROCEDURE — 82553 CREATINE MB FRACTION: CPT

## 2018-02-09 PROCEDURE — 99284 EMERGENCY DEPT VISIT MOD MDM: CPT

## 2018-02-09 PROCEDURE — 36415 COLL VENOUS BLD VENIPUNCTURE: CPT

## 2018-02-09 PROCEDURE — 80053 COMPREHEN METABOLIC PANEL: CPT

## 2018-02-09 PROCEDURE — 93005 ELECTROCARDIOGRAM TRACING: CPT

## 2018-02-09 PROCEDURE — 85025 COMPLETE CBC W/AUTO DIFF WBC: CPT

## 2018-02-09 NOTE — RADIOLOGY REPORT (SQ)
EXAM DESCRIPTION:  CHEST SINGLE VIEW



COMPLETED DATE/TIME:  2/9/2018 3:01 pm



REASON FOR STUDY:  chest pain



COMPARISON:  None.



EXAM PARAMETERS:  NUMBER OF VIEWS: One view.

TECHNIQUE: Single frontal radiographic view of the chest acquired.

RADIATION DOSE: NA

LIMITATIONS: None.



FINDINGS:  LUNGS AND PLEURA: No opacities, masses or pneumothorax. No pleural effusion.

MEDIASTINUM AND HILAR STRUCTURES: No masses.  Contour normal.

HEART AND VASCULAR STRUCTURES: Heart normal in size.  Normal vasculature.

BONES: No acute findings.

HARDWARE: None in the chest.

OTHER: No other significant finding.



IMPRESSION:  NO ACUTE RADIOGRAPHIC FINDING IN THE CHEST.



TECHNICAL DOCUMENTATION:  JOB ID:  [id number]

 2011 [employer]- All Rights Reserved

## 2018-02-09 NOTE — EKG REPORT
SEVERITY:- ABNORMAL ECG -

SINUS RHYTHM

LEFT VENTRICULAR HYPERTROPHY

ABNORMAL T, PROBABLE ISCHEMIA, ANT-LAT LEADS

:

Confirmed by: Rambo Roberson 09-Feb-2018 22:13:35

## 2018-02-09 NOTE — ER DOCUMENT REPORT
ED General





- General


Chief Complaint: Abnormal Lab Results


Stated Complaint: ABNORMAL LABS


Time Seen by Provider: 02/09/18 14:11


Mode of Arrival: Ambulatory


Information source: Patient


Notes: 





This is a 37-year-old man that was referred to the emergency room by the [facility] because of abnormal blood tests (troponin of 0.019).  Patient 

states that the cardiac enzymes were elevated and he was told to come to the 

ER.  The patient does have a history of hypertension and has a significant 

family history for heart disease on his father's side.  Patient states he did 

have a cardiac workup at an outside hospital in May 2017 (he describes an echo 

and stress test) and states that he was told it was normal.  He does have an 

appointment with the cardiologist next week (February 15).  Patient states he 

has been prescribed nitroglycerin and takes it routinely.  Patient is currently 

on isosorbide mononitrate, sublingual nitroglycerin, amlodipine.


TRAVEL OUTSIDE OF THE [place] IN LAST 30 DAYS: No





- HPI


Onset: Last week


Onset/Duration: Gradual


Quality of pain: No pain


Severity: None


Pain Level: Denies


Associated symptoms: denies: Chest pain, Fever, Shortness of breath


Exacerbated by: Denies


Relieved by: Denies


Similar symptoms previously: No


Recently seen / treated by doctor: No





- Related Data


Allergies/Adverse Reactions: 


 





No Known Allergies Allergy (Verified 02/09/18 13:41)


 











Past Medical History





- General


Information source: Patient





- Social History


Smoking Status: Never Smoker


Cigarette use (# per day): No


Chew tobacco use (# tins/day): No


Frequency of alcohol use: None


Drug Abuse: None


Lives with: Family


Family History: Reviewed & Not Pertinent


Patient has suicidal ideation: No


Patient has homicidal ideation: No





- Past Medical History


Cardiac Medical History: Reports: Hx Hypertension


   Denies: Hx Coronary Artery Disease, Hx Heart Attack


Pulmonary Medical History: 


   Denies: Hx Asthma, Hx Bronchitis, Hx COPD, Hx Pneumonia


Neurological Medical History: Denies: Hx Cerebrovascular Accident, Hx Seizures


Renal/ Medical History: Denies: Hx Peritoneal Dialysis


Musculoskeltal Medical History: Denies Hx Arthritis


Surgical Hx: Negative





- Immunizations


Immunizations up to date: Yes


Hx Diphtheria, Pertussis, Tetanus Vaccination: Yes





Review of Systems





- Review of Systems


Constitutional: denies: Chills, Fever


EENT: No symptoms reported


Cardiovascular: See HPI


Respiratory: No symptoms reported


Gastrointestinal: No symptoms reported


Genitourinary: No symptoms reported


Male Genitourinary: No symptoms reported


Musculoskeletal: No symptoms reported


Skin: No symptoms reported


Hematologic/Lymphatic: No symptoms reported


Neurological/Psychological: No symptoms reported





Physical Exam





- Vital signs


Vitals: 


 











Temp Pulse Resp BP Pulse Ox


 


 97.8 F   85   20   147/86 H  100 


 


 02/09/18 14:01  02/09/18 14:01  02/09/18 14:01  02/09/18 14:01  02/09/18 14:01











Notes: 





Physical exam:


 


GENERAL: 37-year-old man, alert and oriented 3, no acute distress.





HEAD: Atraumatic, normocephalic.





EYES: Pupils equal round and reactive to light, extraocular movements intact, 

sclera anicteric, conjunctiva are normal.





ENT: TMs normal, nares patent, oropharynx clear without exudates.  Moist mucous 

membranes.





NECK: Normal range of motion, supple without obvious mass or JVD.





LUNGS: Breath sounds clear to auscultation bilaterally and equal.  No wheezes 

rales or rhonchi.





HEART: Regular rate and rhythm without murmurs, rubs or gallops.





ABDOMEN: Soft, normoactive bowel sounds.  No tenderness to palpation.  No 

guarding, no rebound.  No masses appreciated.





EXTREMITIES: Normal range of motion, no pitting or edema.  No clubbing or 

cyanosis.





NEUROLOGICAL: Cranial nerves II through XII grossly intact.  Normal speech, 

moving all extremities.





PSYCH: Normal mood, normal affect.





SKIN: Warm, Dry, normal turgor, no rashes or lesions noted.





Course





- Re-evaluation


Re-evalutation: 





02/09/18 15:53


Note: The patient states that the only reason he is here is because the [facility] had "freaked him out with reports of the blood test".  Otherwise, he 

states that his chest pain has been the same  that it has been since discharge 

from [facility] last May.  Devyn patient's heart score is 2 (low risk).  I advised 

him that we could increase the sensitivity of the test by doing a delayed 

troponin level at 6:00, but he is absolutely against this.  





The patient is alert and oriented 3 and he understands that this is not 100% 

guarantee that he will not have a heart attack.  I have advised him of this 

wrist and he states he will come back if he feels any worse.


02/09/18 23:47








- Vital Signs


Vital signs: 


 











Temp Pulse Resp BP Pulse Ox


 


 97.8 F   85   20   147/86 H  99 


 


 02/09/18 14:01  02/09/18 14:01  02/09/18 14:12  02/09/18 14:01  02/09/18 14:25














- Laboratory


Result Diagrams: 


 02/09/18 14:35





 02/09/18 14:35


Laboratory results interpreted by me: 


 











  02/09/18





  14:35


 


Potassium  3.5 L


 


Glucose  111 H


 


Creatine Kinase  386 H














- Diagnostic Test


Radiology reviewed: Image reviewed, Reports reviewed - Test x-ray shows no 

infiltrates or effusions





- EKG Interpretation by Me


Rate: Normal


Rhythm: NSR - EKG shows normal sinus rhythm with a ventricular rate of 84, 

there are inverted T's anteriorly which have been there in the past, evidence 

of LVH, no significant change from an EKG performed May 25, 2017





Discharge





- Discharge


Clinical Impression: 


 Chest pain





Condition: Stable


Disposition: HOME, SELF-CARE


Additional Instructions: 


As we discussed, your EKG looked unchanged from the previous.  Your troponin 

level was in the normal range.


While these findings are reassuring, there is never going to be 100% guarantee 

that you cannot have a heart attack.  This is why it is good to follow-up with 

your cardiologist in 1 week as planned.  In the meantime, continue the current 

medicines, I would add a baby aspirin daily to your medical regimen.  And 

return to the emergency room for any worsening episodes of chest pain.


Referrals: 


HARPAL DE LEON MD [Primary Care Provider] - Follow up as needed